# Patient Record
Sex: FEMALE | Race: WHITE | NOT HISPANIC OR LATINO | Employment: UNEMPLOYED | URBAN - METROPOLITAN AREA
[De-identification: names, ages, dates, MRNs, and addresses within clinical notes are randomized per-mention and may not be internally consistent; named-entity substitution may affect disease eponyms.]

---

## 2017-01-26 RX ORDER — VALSARTAN 160 MG/1
160 TABLET ORAL DAILY
Qty: 30 TABLET | Refills: 2 | Status: SHIPPED | OUTPATIENT
Start: 2017-01-26 | End: 2017-03-30 | Stop reason: SDUPTHER

## 2017-01-26 RX ORDER — VALSARTAN AND HYDROCHLOROTHIAZIDE 160; 12.5 MG/1; MG/1
TABLET, FILM COATED ORAL
Qty: 30 TABLET | Refills: 5 | Status: SHIPPED | OUTPATIENT
Start: 2017-01-26 | End: 2017-01-26

## 2017-01-26 NOTE — TELEPHONE ENCOUNTER
PER VO FROM DR NGUYEN PT CHANGED TO VALSARTAN 160 MG QD. PT HAS BEEN TRANSFERRED TO THE FRONT TO SCHEDULE AN APPT TO FOLLOW UP ON HTN.

## 2017-03-30 ENCOUNTER — OFFICE VISIT (OUTPATIENT)
Dept: INTERNAL MEDICINE | Facility: CLINIC | Age: 52
End: 2017-03-30

## 2017-03-30 VITALS
OXYGEN SATURATION: 98 % | DIASTOLIC BLOOD PRESSURE: 100 MMHG | TEMPERATURE: 98.2 F | SYSTOLIC BLOOD PRESSURE: 156 MMHG | HEIGHT: 67 IN | BODY MASS INDEX: 25.77 KG/M2 | WEIGHT: 164.2 LBS | HEART RATE: 78 BPM

## 2017-03-30 DIAGNOSIS — I10 ESSENTIAL HYPERTENSION: Primary | ICD-10-CM

## 2017-03-30 DIAGNOSIS — M54.50 INTERMITTENT LOW BACK PAIN: ICD-10-CM

## 2017-03-30 DIAGNOSIS — G47.00 INSOMNIA, UNSPECIFIED TYPE: ICD-10-CM

## 2017-03-30 DIAGNOSIS — J30.2 SEASONAL ALLERGIC RHINITIS, UNSPECIFIED ALLERGIC RHINITIS TRIGGER: ICD-10-CM

## 2017-03-30 PROBLEM — L30.9 ECZEMA: Status: ACTIVE | Noted: 2017-03-30

## 2017-03-30 PROBLEM — J30.9 ALLERGIC RHINITIS: Status: ACTIVE | Noted: 2017-03-30

## 2017-03-30 PROBLEM — Z00.00 ROUTINE HEALTH MAINTENANCE: Status: ACTIVE | Noted: 2017-03-30

## 2017-03-30 PROCEDURE — 99214 OFFICE O/P EST MOD 30 MIN: CPT | Performed by: FAMILY MEDICINE

## 2017-03-30 RX ORDER — CARISOPRODOL 350 MG/1
350 TABLET ORAL 3 TIMES DAILY PRN
COMMUNITY
End: 2017-10-04

## 2017-03-30 RX ORDER — VALSARTAN 160 MG/1
160 TABLET ORAL DAILY
Qty: 30 TABLET | Refills: 6 | Status: SHIPPED | OUTPATIENT
Start: 2017-03-30 | End: 2017-10-04 | Stop reason: SDUPTHER

## 2017-03-30 RX ORDER — FLUTICASONE PROPIONATE 0.05 %
CREAM (GRAM) TOPICAL 2 TIMES DAILY
COMMUNITY

## 2017-03-30 NOTE — PROGRESS NOTES
Subjective     Vanessa Morales is a 51 y.o. female, who presents with a chief complaint of   Chief Complaint   Patient presents with   • Hypertension     new patient       HPI     1. HTN.  Pt has been treated for HTN for several years.  She took hctz for years but had muscle cramps and chest pain and hypokalemia with this and these symptoms all resolved after she stopped this.  She tolerates valsartan well.  She takes this at night.  Home blood pressures 120s-140s systolic but she has spells in which her blood pressure drops low and she feels light-headed.    2. Insomnia.  She takes diphenhydramine 12.5 mg nightly.    3. Intermittent low back pain.  She does home physical therapy and 1/2 carisoprodol when this flares up.  She has been through formal PT previously.    4. Allergies.  She has been sneezing for a few weeks.    The following portions of the patient's history were reviewed and updated as appropriate: allergies, current medications, past family history, past medical history, past social history, past surgical history and problem list.    Allergies: Review of patient's allergies indicates no known allergies.    Review of Systems   Constitutional: Negative.    HENT: Positive for sneezing.    Eyes: Negative.    Respiratory: Negative.    Cardiovascular: Negative.    Gastrointestinal: Negative.    Endocrine: Negative.    Genitourinary: Negative.    Musculoskeletal: Negative.    Skin: Negative.    Allergic/Immunologic: Positive for environmental allergies.   Neurological: Negative.    Hematological: Negative.    Psychiatric/Behavioral: Negative.        Objective     Wt Readings from Last 3 Encounters:   03/30/17 164 lb 3.2 oz (74.5 kg)   09/15/16 165 lb (74.8 kg)   09/06/16 164 lb 1.6 oz (74.4 kg)     Temp Readings from Last 3 Encounters:   03/30/17 98.2 °F (36.8 °C)   06/26/15 98.4 °F (36.9 °C)     BP Readings from Last 3 Encounters:   03/30/17 156/100   09/15/16 160/90   09/06/16 (!) 146/102     Pulse Readings  from Last 3 Encounters:   03/30/17 78   09/15/16 63   09/06/16 73     Body mass index is 25.72 kg/(m^2).  SpO2 Readings from Last 3 Encounters:   03/30/17 98%   09/06/16 99%   05/31/16 97%       Physical Exam   Constitutional: She is oriented to person, place, and time. She appears well-developed and well-nourished.   HENT:   Head: Normocephalic.   Right Ear: A middle ear effusion is present.   Left Ear: A middle ear effusion is present.   Nose: Mucosal edema present.   Mouth/Throat: Oropharynx is clear and moist.   Eyes: Conjunctivae and EOM are normal. Pupils are equal, round, and reactive to light.   Neck: Normal range of motion. Neck supple. No thyromegaly present.   Cardiovascular: Normal rate, regular rhythm and normal heart sounds.    Pulmonary/Chest: Effort normal and breath sounds normal.   Abdominal: Soft. Bowel sounds are normal. There is no hepatosplenomegaly.   Musculoskeletal: Normal range of motion. She exhibits no edema.   Lymphadenopathy:     She has no cervical adenopathy.   Neurological: She is alert and oriented to person, place, and time.   Skin: Skin is warm and dry. No rash noted.   Psychiatric: She has a normal mood and affect. Her behavior is normal.   Vitals reviewed.      Assessment/Plan   Vanessa was seen today for hypertension.    Diagnoses and all orders for this visit:    Essential hypertension  -     Comprehensive Metabolic Panel; Future  -     Lipid Panel With / Chol / HDL Ratio; Future  -     TSH; Future  -     Hemoglobin A1c; Future    Intermittent low back pain    Insomnia, unspecified type  -     CBC & Differential; Future  -     Vitamin D 25 Hydroxy; Future  -     Vitamin B12; Future    Seasonal allergic rhinitis, unspecified allergic rhinitis trigger    1. HTN.  BP elevated today.  Continue same medication and monitor home blood pressures.  She does have an element of white coat hypertension.    2. Intermittent low back pain.  She knows how to handle these flares.  Feeling well  today.    3. Insomnia.  Continue OTC medications as needed.  May consider trazodone.    4. A.R. Start OTC nasal steroid.      Outpatient Medications Prior to Visit   Medication Sig Dispense Refill   • valsartan (DIOVAN) 160 MG tablet Take 1 tablet by mouth Daily. 30 tablet 2   • carisoprodol (SOMA) 350 MG tablet Take 1 tablet by mouth 3 (three) times a day as needed for muscle spasms. 30 tablet 2   • cholecalciferol (VITAMIN D3) 1000 UNITS tablet Take 1,000 Units by mouth daily.     • KLOR-CON 10 MEQ CR tablet Take 1 tablet by mouth daily. 30 tablet 5   • meloxicam (MOBIC) 15 MG tablet Take 1 tablet (15 mg total) by mouth daily. 30 tablet 0   • omeprazole (PRILOSEC) 40 MG capsule Take 1 capsule by mouth daily. 30 capsule 1   • oxyCODONE-acetaminophen (PERCOCET) 7.5-325 MG per tablet Take 1 tablet by mouth every 6 (six) hours as needed for moderate pain (4-6). Max Daily Amount: 4 tablets 24 tablet 0   • vitamin B-12 (CYANOCOBALAMIN) 1000 MCG tablet Take 1,000 mcg by mouth daily.       No facility-administered medications prior to visit.      No orders of the defined types were placed in this encounter.    [unfilled]  Medications Discontinued During This Encounter   Medication Reason   • vitamin B-12 (CYANOCOBALAMIN) 1000 MCG tablet Therapy completed   • oxyCODONE-acetaminophen (PERCOCET) 7.5-325 MG per tablet Therapy completed   • meloxicam (MOBIC) 15 MG tablet Therapy completed   • carisoprodol (SOMA) 350 MG tablet Therapy completed   • cholecalciferol (VITAMIN D3) 1000 UNITS tablet Therapy completed   • KLOR-CON 10 MEQ CR tablet Therapy completed   • omeprazole (PRILOSEC) 40 MG capsule Therapy completed         Return in about 6 months (around 9/30/2017).

## 2017-04-04 ENCOUNTER — RESULTS ENCOUNTER (OUTPATIENT)
Dept: INTERNAL MEDICINE | Facility: CLINIC | Age: 52
End: 2017-04-04

## 2017-04-04 DIAGNOSIS — I10 ESSENTIAL HYPERTENSION: ICD-10-CM

## 2017-04-04 DIAGNOSIS — G47.00 INSOMNIA, UNSPECIFIED TYPE: ICD-10-CM

## 2017-07-10 ENCOUNTER — APPOINTMENT (OUTPATIENT)
Dept: WOMENS IMAGING | Facility: HOSPITAL | Age: 52
End: 2017-07-10

## 2017-07-10 PROCEDURE — 77067 SCR MAMMO BI INCL CAD: CPT | Performed by: RADIOLOGY

## 2017-10-04 ENCOUNTER — OFFICE VISIT (OUTPATIENT)
Dept: INTERNAL MEDICINE | Facility: CLINIC | Age: 52
End: 2017-10-04

## 2017-10-04 VITALS
HEIGHT: 67 IN | TEMPERATURE: 98.3 F | DIASTOLIC BLOOD PRESSURE: 102 MMHG | SYSTOLIC BLOOD PRESSURE: 170 MMHG | BODY MASS INDEX: 25.62 KG/M2 | WEIGHT: 163.2 LBS | OXYGEN SATURATION: 98 % | HEART RATE: 70 BPM

## 2017-10-04 DIAGNOSIS — Z00.00 ANNUAL PHYSICAL EXAM: Primary | ICD-10-CM

## 2017-10-04 DIAGNOSIS — I10 ESSENTIAL HYPERTENSION: ICD-10-CM

## 2017-10-04 LAB
25(OH)D3+25(OH)D2 SERPL-MCNC: 32.9 NG/ML
ALBUMIN SERPL-MCNC: 4.5 G/DL (ref 3.5–5.2)
ALBUMIN/GLOB SERPL: 1.7 G/DL
ALP SERPL-CCNC: 66 U/L (ref 40–129)
ALT SERPL-CCNC: 15 U/L (ref 5–33)
AST SERPL-CCNC: 20 U/L (ref 5–32)
BASOPHILS # BLD AUTO: 0.06 10*3/MM3 (ref 0–0.2)
BASOPHILS NFR BLD AUTO: 1.2 % (ref 0–2)
BILIRUB SERPL-MCNC: 1 MG/DL (ref 0.2–1.2)
BUN SERPL-MCNC: 15 MG/DL (ref 6–20)
BUN/CREAT SERPL: 19.5 (ref 7–25)
CALCIUM SERPL-MCNC: 9.5 MG/DL (ref 8.6–10.5)
CHLORIDE SERPL-SCNC: 101 MMOL/L (ref 98–107)
CHOLEST SERPL-MCNC: 183 MG/DL (ref 0–200)
CHOLEST/HDLC SERPL: 2.41 {RATIO}
CO2 SERPL-SCNC: 29.1 MMOL/L (ref 22–29)
CREAT SERPL-MCNC: 0.77 MG/DL (ref 0.57–1)
EOSINOPHIL # BLD AUTO: 0.27 10*3/MM3 (ref 0.1–0.3)
EOSINOPHIL NFR BLD AUTO: 5.2 % (ref 0–4)
ERYTHROCYTE [DISTWIDTH] IN BLOOD BY AUTOMATED COUNT: 12.8 % (ref 11.5–14.5)
GLOBULIN SER CALC-MCNC: 2.6 GM/DL
GLUCOSE SERPL-MCNC: 94 MG/DL (ref 65–99)
HBA1C MFR BLD: 5.3 % (ref 4.8–5.6)
HCT VFR BLD AUTO: 39.6 % (ref 37–47)
HDLC SERPL-MCNC: 76 MG/DL (ref 40–60)
HGB BLD-MCNC: 13.7 G/DL (ref 12–16)
IMM GRANULOCYTES # BLD: 0.01 10*3/MM3 (ref 0–0.03)
IMM GRANULOCYTES NFR BLD: 0.2 % (ref 0–0.5)
LDLC SERPL CALC-MCNC: 95 MG/DL (ref 0–100)
LYMPHOCYTES # BLD AUTO: 1.19 10*3/MM3 (ref 0.6–4.8)
LYMPHOCYTES NFR BLD AUTO: 23 % (ref 20–45)
MCH RBC QN AUTO: 29.7 PG (ref 27–31)
MCHC RBC AUTO-ENTMCNC: 34.6 G/DL (ref 31–37)
MCV RBC AUTO: 85.7 FL (ref 81–99)
MONOCYTES # BLD AUTO: 0.39 10*3/MM3 (ref 0–1)
MONOCYTES NFR BLD AUTO: 7.5 % (ref 3–8)
NEUTROPHILS # BLD AUTO: 3.26 10*3/MM3 (ref 1.5–8.3)
NEUTROPHILS NFR BLD AUTO: 62.9 % (ref 45–70)
NRBC BLD AUTO-RTO: 0 /100 WBC (ref 0–0)
PLATELET # BLD AUTO: 279 10*3/MM3 (ref 140–500)
POTASSIUM SERPL-SCNC: 4 MMOL/L (ref 3.5–5.2)
PROT SERPL-MCNC: 7.1 G/DL (ref 6–8.5)
RBC # BLD AUTO: 4.62 10*6/MM3 (ref 4.2–5.4)
SODIUM SERPL-SCNC: 141 MMOL/L (ref 136–145)
TRIGL SERPL-MCNC: 61 MG/DL (ref 0–150)
TSH SERPL DL<=0.005 MIU/L-ACNC: 1.24 MIU/ML (ref 0.27–4.2)
VIT B12 SERPL-MCNC: 359 PG/ML (ref 211–946)
VLDLC SERPL CALC-MCNC: 12.2 MG/DL (ref 7–27)
WBC # BLD AUTO: 5.18 10*3/MM3 (ref 4.8–10.8)

## 2017-10-04 PROCEDURE — 99396 PREV VISIT EST AGE 40-64: CPT | Performed by: FAMILY MEDICINE

## 2017-10-04 RX ORDER — VALSARTAN 160 MG/1
240 TABLET ORAL DAILY
Qty: 135 TABLET | Refills: 1 | Status: SHIPPED | OUTPATIENT
Start: 2017-10-04 | End: 2017-11-01 | Stop reason: SDUPTHER

## 2017-10-04 RX ORDER — SODIUM PHOSPHATE,MONO-DIBASIC 19G-7G/118
ENEMA (ML) RECTAL
COMMUNITY
End: 2018-04-23

## 2017-10-04 NOTE — PROGRESS NOTES
Patient Name: Vanessa Mendez is a 52 y.o. female presenting for Annual Exam      Well Adult Physical   Patient here for a comprehensive physical exam.The patient reports no problems    Do you take any herbs or supplements that were not prescribed by a doctor? yes   Are you taking calcium supplements? no   Are you taking aspirin daily? no     History:  Any STD's in the past? none    Vanessa Morales 52 y.o. female who presents for an Annual Wellness Visit.  she has a history of   Patient Active Problem List   Diagnosis   • Hypertension   • Intermittent low back pain   • Sacroiliac joint dysfunction of left side   • Insomnia   • Eczema   • Routine health maintenance   • Allergic rhinitis   .  she has been doing well with new interval problems.      Health Habits:  Dental Exam. up to date  Eye Exam. up to date  Exercise: 7 times/week.  Current exercise activities include: walking and yoga    Menstrual history:  Last pap date:   Abnormal pap?   Mammogram:  Dexa:  Colonoscopy:  Tob use:  Qualifies for lung Ca screening?      The following portions of the patient's history were reviewed and updated as appropriate: allergies, current medications, past family history, past medical history, past social history, past surgical history and problem list.    Review of Systems   Constitutional: Negative.    HENT: Negative.    Eyes: Negative.    Respiratory: Negative.    Cardiovascular: Negative.    Gastrointestinal: Negative.    Endocrine: Negative.    Genitourinary: Negative.    Musculoskeletal: Positive for back pain.   Skin: Positive for rash.   Allergic/Immunologic: Negative.    Neurological: Negative.    Hematological: Negative.        Review of patient's allergies indicates no known allergies.      Current Outpatient Prescriptions:   •  fluticasone (CUTIVATE) 0.05 % cream, Apply  topically 2 (Two) Times a Day., Disp: , Rfl:   •  glucosamine sulfate 500 MG capsule capsule, Take  by mouth 3 (Three) Times a Day  "With Meals., Disp: , Rfl:   •  valsartan (DIOVAN) 160 MG tablet, Take 1.5 tablets by mouth Daily. Take 1/2 tablet in the morning and 1 tablet in the evening., Disp: 135 tablet, Rfl: 1    OBJECTIVE    BP (!) 170/102  Pulse 70  Temp 98.3 °F (36.8 °C)  Ht 67\" (170.2 cm)  Wt 163 lb 3.2 oz (74 kg)  SpO2 98%  BMI 25.56 kg/m2    Physical Exam   Constitutional: She is oriented to person, place, and time. She appears well-developed and well-nourished.   HENT:   Head: Normocephalic and atraumatic.   Right Ear: External ear normal.   Left Ear: External ear normal.   Nose: Nose normal.   Mouth/Throat: Oropharynx is clear and moist.   Eyes: Conjunctivae and EOM are normal. Pupils are equal, round, and reactive to light. No scleral icterus.   Neck: Normal range of motion. Neck supple. No thyromegaly present.   Cardiovascular: Normal rate, regular rhythm, normal heart sounds and intact distal pulses.  Exam reveals no gallop and no friction rub.    No murmur heard.  Pulmonary/Chest: Effort normal and breath sounds normal. No respiratory distress. She has no wheezes. She has no rales.   Abdominal: Soft. Bowel sounds are normal. There is no hepatosplenomegaly.   Musculoskeletal: She exhibits no edema or deformity.   Lymphadenopathy:     She has no cervical adenopathy.   Neurological: She is alert and oriented to person, place, and time. She has normal reflexes. She displays normal reflexes. No cranial nerve deficit. She exhibits normal muscle tone. Coordination normal.   Skin: Skin is warm and dry. No rash noted.   Psychiatric: She has a normal mood and affect. Her behavior is normal. Judgment and thought content normal.       Vanessa was seen today for annual exam.    Diagnoses and all orders for this visit:    Annual physical exam  -     CBC & Differential  -     Comprehensive Metabolic Panel  -     Hemoglobin A1c  -     Lipid Panel With / Chol / HDL Ratio  -     TSH  -     Vitamin B12  -     Vitamin D 25 Hydroxy  -     " Hepatitis C antibody; Future    Essential hypertension  -     valsartan (DIOVAN) 160 MG tablet; Take 1.5 tablets by mouth Daily. Take 1/2 tablet in the morning and 1 tablet in the evening.    UTD on Tdap (July 2017), declines flu vaccine; she will look into Zoster vaccine at pharmacy.  UTD on pap smear and mammogram through Dr. Pearson.  UTD on colonoscopy.  Labs today.    For HTN, will increase valsartan to 1.5 tablets daily.  Monitor home blood pressures.  F/U 1 month.    [unfilled]    Return in about 4 weeks (around 11/1/2017).

## 2017-10-05 ENCOUNTER — TELEPHONE (OUTPATIENT)
Dept: INTERNAL MEDICINE | Facility: CLINIC | Age: 52
End: 2017-10-05

## 2017-10-05 NOTE — TELEPHONE ENCOUNTER
Patient advised of results.     ----- Message from Josh Cohen MD sent at 10/4/2017 10:34 PM EDT -----  Please call the patient regarding her result.  Labs are normal.

## 2017-10-09 ENCOUNTER — RESULTS ENCOUNTER (OUTPATIENT)
Dept: INTERNAL MEDICINE | Facility: CLINIC | Age: 52
End: 2017-10-09

## 2017-10-09 DIAGNOSIS — Z00.00 ANNUAL PHYSICAL EXAM: ICD-10-CM

## 2017-11-01 ENCOUNTER — OFFICE VISIT (OUTPATIENT)
Dept: INTERNAL MEDICINE | Facility: CLINIC | Age: 52
End: 2017-11-01

## 2017-11-01 VITALS
HEIGHT: 67 IN | HEART RATE: 71 BPM | TEMPERATURE: 98.6 F | OXYGEN SATURATION: 99 % | SYSTOLIC BLOOD PRESSURE: 140 MMHG | WEIGHT: 165.6 LBS | DIASTOLIC BLOOD PRESSURE: 86 MMHG | BODY MASS INDEX: 25.99 KG/M2

## 2017-11-01 DIAGNOSIS — R00.2 PALPITATIONS: ICD-10-CM

## 2017-11-01 DIAGNOSIS — I10 ESSENTIAL HYPERTENSION: Primary | ICD-10-CM

## 2017-11-01 PROCEDURE — 99213 OFFICE O/P EST LOW 20 MIN: CPT | Performed by: FAMILY MEDICINE

## 2017-11-01 RX ORDER — VALSARTAN 160 MG/1
160 TABLET ORAL 2 TIMES DAILY
Qty: 180 TABLET | Refills: 1 | Status: SHIPPED | OUTPATIENT
Start: 2017-11-01 | End: 2018-04-23 | Stop reason: SDUPTHER

## 2017-11-01 NOTE — PROGRESS NOTES
Subjective     Vanessa Morales is a 52 y.o. female, who presents with a chief complaint of   Chief Complaint   Patient presents with   • Hypertension       HPI     Pt here to f/u on hypertension.  We increased the valsartan 160 mg tabs to 1.5 tablets daily.  She denies dizziness.  Home blood pressures running 141-166/70s-90.  She has palpitations intermittently X years.  She had a normal stress test one year ago.      The following portions of the patient's history were reviewed and updated as appropriate: allergies, current medications, past family history, past medical history, past social history, past surgical history and problem list.    Allergies: Review of patient's allergies indicates no known allergies.    Review of Systems   Constitutional: Negative.    HENT: Negative.    Eyes: Negative.    Respiratory: Negative.    Cardiovascular: Positive for palpitations.   Gastrointestinal: Negative.    Endocrine: Negative.    Genitourinary: Negative.    Musculoskeletal: Positive for back pain.   Allergic/Immunologic: Negative.    Neurological: Negative.    Hematological: Negative.        Objective     Wt Readings from Last 3 Encounters:   11/01/17 165 lb 9.6 oz (75.1 kg)   10/04/17 163 lb 3.2 oz (74 kg)   03/30/17 164 lb 3.2 oz (74.5 kg)     Temp Readings from Last 3 Encounters:   11/01/17 98.6 °F (37 °C)   10/04/17 98.3 °F (36.8 °C)   03/30/17 98.2 °F (36.8 °C)     BP Readings from Last 3 Encounters:   11/01/17 140/86   10/04/17 (!) 170/102   03/30/17 156/100     Pulse Readings from Last 3 Encounters:   11/01/17 71   10/04/17 70   03/30/17 78     Body mass index is 25.94 kg/(m^2).  SpO2 Readings from Last 3 Encounters:   11/01/17 99%   10/04/17 98%   03/30/17 98%       Physical Exam   Constitutional: She is oriented to person, place, and time. She appears well-developed and well-nourished.   HENT:   Head: Normocephalic.   Mouth/Throat: Oropharynx is clear and moist.   Eyes: Conjunctivae and EOM are normal. Pupils are  equal, round, and reactive to light.   Neck: Normal range of motion. Neck supple. No thyromegaly present.   Cardiovascular: Normal rate, regular rhythm and normal heart sounds.    Pulmonary/Chest: Effort normal and breath sounds normal.   Abdominal: Soft. Bowel sounds are normal. There is no hepatosplenomegaly.   Musculoskeletal: Normal range of motion. She exhibits no edema.   Lymphadenopathy:     She has no cervical adenopathy.   Neurological: She is alert and oriented to person, place, and time.   Skin: Skin is warm and dry. No rash noted.   Psychiatric: She has a normal mood and affect. Her behavior is normal.   Vitals reviewed.      Results for orders placed or performed in visit on 10/04/17   Comprehensive Metabolic Panel   Result Value Ref Range    Glucose 94 65 - 99 mg/dL    BUN 15 6 - 20 mg/dL    Creatinine 0.77 0.57 - 1.00 mg/dL    eGFR Non African Am 78 >60 mL/min/1.73    eGFR African Am 95 >60 mL/min/1.73    BUN/Creatinine Ratio 19.5 7.0 - 25.0    Sodium 141 136 - 145 mmol/L    Potassium 4.0 3.5 - 5.2 mmol/L    Chloride 101 98 - 107 mmol/L    Total CO2 29.1 (H) 22.0 - 29.0 mmol/L    Calcium 9.5 8.6 - 10.5 mg/dL    Total Protein 7.1 6.0 - 8.5 g/dL    Albumin 4.50 3.50 - 5.20 g/dL    Globulin 2.6 gm/dL    A/G Ratio 1.7 g/dL    Total Bilirubin 1.0 0.2 - 1.2 mg/dL    Alkaline Phosphatase 66 40 - 129 U/L    AST (SGOT) 20 5 - 32 U/L    ALT (SGPT) 15 5 - 33 U/L   Hemoglobin A1c   Result Value Ref Range    Hemoglobin A1C 5.30 4.80 - 5.60 %   Lipid Panel With / Chol / HDL Ratio   Result Value Ref Range    Total Cholesterol 183 0 - 200 mg/dL    Triglycerides 61 0 - 150 mg/dL    HDL Cholesterol 76 (H) 40 - 60 mg/dL    VLDL Cholesterol 12.2 7 - 27 mg/dL    LDL Cholesterol  95 0 - 100 mg/dL    Chol/HDL Ratio 2.41    TSH   Result Value Ref Range    TSH 1.240 0.270 - 4.200 mIU/mL   Vitamin B12   Result Value Ref Range    Vitamin B-12 359 211 - 946 pg/mL   Vitamin D 25 Hydroxy   Result Value Ref Range    25 Hydroxy,  Vitamin D 32.9 ng/mL   CBC & Differential   Result Value Ref Range    WBC 5.18 4.80 - 10.80 10*3/mm3    RBC 4.62 4.20 - 5.40 10*6/mm3    Hemoglobin 13.7 12.0 - 16.0 g/dL    Hematocrit 39.6 37.0 - 47.0 %    MCV 85.7 81.0 - 99.0 fL    MCH 29.7 27.0 - 31.0 pg    MCHC 34.6 31.0 - 37.0 g/dL    RDW 12.8 11.5 - 14.5 %    Platelets 279 140 - 500 10*3/mm3    Neutrophil Rel % 62.9 45.0 - 70.0 %    Lymphocyte Rel % 23.0 20.0 - 45.0 %    Monocyte Rel % 7.5 3.0 - 8.0 %    Eosinophil Rel % 5.2 (H) 0.0 - 4.0 %    Basophil Rel % 1.2 0.0 - 2.0 %    Neutrophils Absolute 3.26 1.50 - 8.30 10*3/mm3    Lymphocytes Absolute 1.19 0.60 - 4.80 10*3/mm3    Monocytes Absolute 0.39 0.00 - 1.00 10*3/mm3    Eosinophils Absolute 0.27 0.10 - 0.30 10*3/mm3    Basophils Absolute 0.06 0.00 - 0.20 10*3/mm3    Immature Granulocyte Rel % 0.2 0.0 - 0.5 %    Immature Grans Absolute 0.01 0.00 - 0.03 10*3/mm3    nRBC 0.0 0.0 - 0.0 /100 WBC       Assessment/Plan   Vanessa was seen today for hypertension.    Diagnoses and all orders for this visit:    Essential hypertension  -     valsartan (DIOVAN) 160 MG tablet; Take 1 tablet by mouth 2 (Two) Times a Day. Take 1/2 tablet in the morning and 1 tablet in the evening.    Palpitations  -     Holter Monitor - 24 Hour; Future    1. HTN.  Improved but still not at goal.  Increase valsartan to 160 mg BID (doesn't seem to last 24 hours in her so we're splitting into two doses).    2. Palpitations.  Chronic.  Stress test result reviewed.  Holter monitor ordered.      Outpatient Medications Prior to Visit   Medication Sig Dispense Refill   • fluticasone (CUTIVATE) 0.05 % cream Apply  topically 2 (Two) Times a Day.     • glucosamine sulfate 500 MG capsule capsule Take  by mouth 3 (Three) Times a Day With Meals.     • valsartan (DIOVAN) 160 MG tablet Take 1.5 tablets by mouth Daily. Take 1/2 tablet in the morning and 1 tablet in the evening. 135 tablet 1     No facility-administered medications prior to visit.      New  Medications Ordered This Visit   Medications   • valsartan (DIOVAN) 160 MG tablet     Sig: Take 1 tablet by mouth 2 (Two) Times a Day. Take 1/2 tablet in the morning and 1 tablet in the evening.     Dispense:  180 tablet     Refill:  1     [unfilled]  Medications Discontinued During This Encounter   Medication Reason   • valsartan (DIOVAN) 160 MG tablet Reorder         Return in about 4 weeks (around 11/29/2017).

## 2017-11-06 ENCOUNTER — HOSPITAL ENCOUNTER (OUTPATIENT)
Dept: CARDIOLOGY | Facility: HOSPITAL | Age: 52
Discharge: HOME | End: 2017-11-06
Attending: FAMILY MEDICINE | Admitting: FAMILY MEDICINE

## 2017-11-06 DIAGNOSIS — R00.2 PALPITATIONS: ICD-10-CM

## 2017-11-06 PROCEDURE — 93226 XTRNL ECG REC<48 HR SCAN A/R: CPT

## 2017-11-06 PROCEDURE — 93225 XTRNL ECG REC<48 HRS REC: CPT

## 2017-11-07 PROCEDURE — 93227 XTRNL ECG REC<48 HR R&I: CPT | Performed by: INTERNAL MEDICINE

## 2017-12-14 ENCOUNTER — TELEPHONE (OUTPATIENT)
Dept: INTERNAL MEDICINE | Facility: CLINIC | Age: 52
End: 2017-12-14

## 2017-12-14 DIAGNOSIS — I10 ESSENTIAL HYPERTENSION: Primary | ICD-10-CM

## 2017-12-14 RX ORDER — AMLODIPINE BESYLATE 5 MG/1
5 TABLET ORAL DAILY
Qty: 30 TABLET | Refills: 2 | Status: SHIPPED | OUTPATIENT
Start: 2017-12-14 | End: 2018-01-23 | Stop reason: SDUPTHER

## 2017-12-14 NOTE — TELEPHONE ENCOUNTER
Patient advised as per below and scheduled f/u 1/22/2018.    ----- Message from Josh Brink MD sent at 12/14/2017  5:08 PM EST -----  I escribed amlodipine 5 mg.  She should take this in addition to the valsartan.  Increase to two tablets daily in 2 weeks if still not at goal.  Follow up with me in 4 weeks.  ----- Message -----     From: Hermila Pollard MA     Sent: 12/14/2017   8:17 AM       To: Josh Brink MD     PT  HAS ALREADY  BEEN HERE SEVERAL TIMES, COST  100 EVERY TIME SHE COMES IN  AND SHE CAN AFFORT TO COME IN    ----- Message -----     From: Josh Brink MD     Sent: 12/13/2017   4:58 PM       To: Hermila Pollard MA    She needs an appointment.  She was supposed to f/u in 4 weeks to see if she this med would get her controlled or if we would need to add a second medication.  ----- Message -----     From: Hermila Pollard MA     Sent: 12/12/2017   3:58 PM       To: Josh Brink MD, Aiyana Fitzgerald MA        ----- Message -----     From: Mariaelena Cook     Sent: 12/12/2017   3:48 PM       To: Cristi Stephenson [facility]    DR BRINK HAS PUT HER ON MAX DOSE OF BP MEDS AND IT'S NOT REALLY WORKING.  SHE DIDN'T KNOW IF SHE WANTED TO CHANGE THE MEDICINE TO SOMETHING ELSE OR WHAT.  SHE DOES NEED A 90 DAY SUPPLY IN ORDER FOR HER INSURANCE TO COVER IT.    [facility] [place]    [phone]

## 2017-12-18 ENCOUNTER — TELEPHONE (OUTPATIENT)
Dept: INTERNAL MEDICINE | Facility: CLINIC | Age: 52
End: 2017-12-18

## 2017-12-18 NOTE — TELEPHONE ENCOUNTER
----- Message from Josh Brink MD sent at 12/18/2017  9:55 AM EST -----  Yes, I already addressed it.  I added amlodipine and said she needs f/u in 1 month.  ----- Message -----     From: Aiyana Fitzgerald MA     Sent: 12/15/2017  12:02 PM       To: Josh Brink MD    You probably have already seen this but I am always afraid something will fall through the cracks.dg;)  ----- Message -----     From: Hermila Pollard MA     Sent: 12/12/2017   3:58 PM       To: Josh Brink MD, Aiyana Fitzgerald MA        ----- Message -----     From: Mariaelena Cook     Sent: 12/12/2017   3:48 PM       To: Cristi Stephenson [facility]    DR BRINK HAS PUT HER ON MAX DOSE OF BP MEDS AND IT'S NOT REALLY WORKING.  SHE DIDN'T KNOW IF SHE WANTED TO CHANGE THE MEDICINE TO SOMETHING ELSE OR WHAT.  SHE DOES NEED A 90 DAY SUPPLY IN ORDER FOR HER INSURANCE TO COVER IT.    [facility] IN [place]    [phone]

## 2017-12-19 ENCOUNTER — TELEPHONE (OUTPATIENT)
Dept: INTERNAL MEDICINE | Facility: CLINIC | Age: 52
End: 2017-12-19

## 2017-12-19 NOTE — TELEPHONE ENCOUNTER
----- Message from Josh Brink MD sent at 12/18/2017  9:55 AM EST -----  Yes, I already addressed it.  I added amlodipine and said she needs f/u in 1 month.  ----- Message -----     From: Aiyana Fitzgerald MA     Sent: 12/15/2017  12:02 PM       To: Johs Brink MD    You probably have already seen this but I am always afraid something will fall through the cracks.dg;)  ----- Message -----     From: Hermila Pollard MA     Sent: 12/12/2017   3:58 PM       To: Josh Brink MD, Aiyana Fitzgerald MA        ----- Message -----     From: Mariaelena Cook     Sent: 12/12/2017   3:48 PM       To: Cristi Valencia[address] [place] [address]    DR BRINK HAS PUT HER ON MAX DOSE OF BP MEDS AND IT'S NOT REALLY WORKING.  SHE DIDN'T KNOW IF SHE WANTED TO CHANGE THE MEDICINE TO SOMETHING ELSE OR WHAT.  SHE DOES NEED A 90 DAY SUPPLY IN ORDER FOR HER INSURANCE TO COVER IT.    [facility] [place]    [phone]      Pt was taken care of.dg

## 2018-01-18 ENCOUNTER — TELEPHONE (OUTPATIENT)
Dept: INTERNAL MEDICINE | Facility: CLINIC | Age: 53
End: 2018-01-18

## 2018-01-18 NOTE — TELEPHONE ENCOUNTER
Pt called and said that she is doing so well with the BP meds that you gave her that she would prefer not to come in for a recheck. She is afraid of the flu. She wants to come in about 3 months for her physical and stay on the pills she is taking since it is working so well. She is taking Norvasc 5 mg daily and Valsartan l60 2 tabs daily. I will call her if you want her to come in.dg

## 2018-01-23 DIAGNOSIS — I10 ESSENTIAL HYPERTENSION: ICD-10-CM

## 2018-01-23 RX ORDER — AMLODIPINE BESYLATE 5 MG/1
TABLET ORAL
Qty: 30 TABLET | Refills: 2 | Status: SHIPPED | OUTPATIENT
Start: 2018-01-23 | End: 2018-01-25 | Stop reason: SDUPTHER

## 2018-01-25 DIAGNOSIS — I10 ESSENTIAL HYPERTENSION: ICD-10-CM

## 2018-01-25 RX ORDER — AMLODIPINE BESYLATE 5 MG/1
TABLET ORAL
Qty: 30 TABLET | Refills: 2 | Status: CANCELLED | OUTPATIENT
Start: 2018-01-25

## 2018-01-25 RX ORDER — AMLODIPINE BESYLATE 5 MG/1
TABLET ORAL
Qty: 180 TABLET | Refills: 2 | Status: SHIPPED | OUTPATIENT
Start: 2018-01-25 | End: 2018-04-23

## 2018-04-23 ENCOUNTER — OFFICE VISIT (OUTPATIENT)
Dept: INTERNAL MEDICINE | Facility: CLINIC | Age: 53
End: 2018-04-23

## 2018-04-23 VITALS
RESPIRATION RATE: 18 BRPM | SYSTOLIC BLOOD PRESSURE: 150 MMHG | TEMPERATURE: 98.3 F | WEIGHT: 168.4 LBS | BODY MASS INDEX: 26.43 KG/M2 | HEART RATE: 73 BPM | DIASTOLIC BLOOD PRESSURE: 90 MMHG | OXYGEN SATURATION: 98 % | HEIGHT: 67 IN

## 2018-04-23 DIAGNOSIS — I10 ESSENTIAL HYPERTENSION: ICD-10-CM

## 2018-04-23 DIAGNOSIS — Z00.00 ANNUAL PHYSICAL EXAM: Primary | ICD-10-CM

## 2018-04-23 PROCEDURE — 99396 PREV VISIT EST AGE 40-64: CPT | Performed by: FAMILY MEDICINE

## 2018-04-23 RX ORDER — FLUTICASONE PROPIONATE 50 MCG
2 SPRAY, SUSPENSION (ML) NASAL DAILY
Qty: 1 BOTTLE | Refills: 2 | Status: SHIPPED | OUTPATIENT
Start: 2018-04-23

## 2018-04-23 RX ORDER — VALSARTAN 160 MG/1
TABLET ORAL
Qty: 180 TABLET | Refills: 1 | Status: SHIPPED | OUTPATIENT
Start: 2018-04-23 | End: 2018-08-11 | Stop reason: SDUPTHER

## 2018-04-23 RX ORDER — VALSARTAN 160 MG/1
160 TABLET ORAL 2 TIMES DAILY
Qty: 180 TABLET | Refills: 1 | Status: SHIPPED | OUTPATIENT
Start: 2018-04-23 | End: 2018-04-23 | Stop reason: SDUPTHER

## 2018-04-23 NOTE — PROGRESS NOTES
Patient Name: Vanessa Mendez is a 52 y.o. female presenting for Annual Exam      Well Adult Physical   Patient here for a comprehensive physical exam.The patient reports no problems    Do you take any herbs or supplements that were not prescribed by a doctor? no   Are you taking calcium supplements? no   Are you taking aspirin daily? no     History:  Any STD's in the past? none    Vanessa Morales 52 y.o. female who presents for an Annual Wellness Visit.  she has a history of   Patient Active Problem List   Diagnosis   • Hypertension   • Intermittent low back pain   • Sacroiliac joint dysfunction of left side   • Insomnia   • Eczema   • Routine health maintenance   • Allergic rhinitis   • Palpitations   .  she has been doing well with new interval problems.      Health Habits:  Dental Exam. up to date  Eye Exam. up to date  Exercise: 4 times/week.  Current exercise activities include: walking      The following portions of the patient's history were reviewed and updated as appropriate: allergies, current medications, past family history, past medical history, past social history, past surgical history and problem list.    Review of Systems   Constitutional: Negative.    HENT: Negative.    Eyes: Negative.    Respiratory: Negative.    Cardiovascular: Negative.    Gastrointestinal: Negative.    Endocrine: Negative.    Genitourinary: Negative.    Musculoskeletal: Negative.    Skin: Negative.    Allergic/Immunologic: Positive for environmental allergies.   Neurological: Negative.    Hematological: Negative.    Psychiatric/Behavioral: Negative.        Review of patient's allergies indicates no known allergies.      Current Outpatient Prescriptions:   •  fluticasone (CUTIVATE) 0.05 % cream, Apply  topically 2 (Two) Times a Day., Disp: , Rfl:   •  valsartan (DIOVAN) 160 MG tablet, Take 1 tablet by mouth 2 (Two) Times a Day. Take 1/2 tablet in the morning and 1 tablet in the evening., Disp: 180 tablet, Rfl:  "1  •  fluticasone (FLONASE) 50 MCG/ACT nasal spray, 2 sprays into each nostril Daily., Disp: 1 bottle, Rfl: 2    OBJECTIVE    /90   Pulse 73   Temp 98.3 °F (36.8 °C)   Resp 18   Ht 170.2 cm (67.01\")   Wt 76.4 kg (168 lb 6.4 oz)   SpO2 98%   BMI 26.37 kg/m²     Physical Exam   Constitutional: She is oriented to person, place, and time. She appears well-developed and well-nourished.   HENT:   Head: Normocephalic and atraumatic.   Right Ear: External ear normal.   Left Ear: External ear normal.   Nose: Nose normal.   Mouth/Throat: Oropharynx is clear and moist.   Eyes: Conjunctivae and EOM are normal. Pupils are equal, round, and reactive to light. No scleral icterus.   Neck: Normal range of motion. Neck supple. No thyromegaly present.   Cardiovascular: Normal rate, regular rhythm, normal heart sounds and intact distal pulses.  Exam reveals no gallop and no friction rub.    No murmur heard.  Pulmonary/Chest: Effort normal and breath sounds normal. No respiratory distress. She has no wheezes. She has no rales.   Abdominal: Soft. Bowel sounds are normal. She exhibits no mass. There is no hepatosplenomegaly. There is no tenderness.   Musculoskeletal: She exhibits no edema or deformity.   Lymphadenopathy:     She has no cervical adenopathy.   Neurological: She is alert and oriented to person, place, and time. She has normal reflexes. She displays normal reflexes. No cranial nerve deficit. She exhibits normal muscle tone. Coordination normal.   Skin: Skin is warm and dry. No rash noted.   Psychiatric: She has a normal mood and affect. Her behavior is normal. Judgment and thought content normal.   Nursing note reviewed.      ASSESSMENT AND PLAN  Update vaccines if indicated.    attempt to lose weight, reduce salt in diet and cooking, continue current medications and return for routine annual checkups    Vanessa was seen today for annual exam.    Diagnoses and all orders for this visit:    Annual physical exam  -   "   CBC & Differential; Future  -     Comprehensive Metabolic Panel; Future  -     Lipid Panel With / Chol / HDL Ratio; Future  -     TSH; Future  -     Vitamin B12; Future  -     Vitamin D 25 Hydroxy; Future  -     Hepatitis C Antibody; Future    Other orders  -     fluticasone (FLONASE) 50 MCG/ACT nasal spray; 2 sprays into each nostril Daily.    Colonoscopy UTD; she will see Dr. Pearson in August for gyn, mammo, pap smear.  Tdap done 7/2017.  Hepatitis A vaccine at pharmacy.  Considering zoster vaccine at pharmacy.    [unfilled]    Return in about 6 months (around 10/23/2018).

## 2018-08-11 DIAGNOSIS — I10 ESSENTIAL HYPERTENSION: ICD-10-CM

## 2018-08-13 RX ORDER — VALSARTAN 160 MG/1
TABLET ORAL
Qty: 180 TABLET | Refills: 1 | Status: SHIPPED | OUTPATIENT
Start: 2018-08-13 | End: 2018-08-23

## 2018-08-14 ENCOUNTER — APPOINTMENT (OUTPATIENT)
Dept: WOMENS IMAGING | Facility: HOSPITAL | Age: 53
End: 2018-08-14

## 2018-08-14 PROCEDURE — 77067 SCR MAMMO BI INCL CAD: CPT | Performed by: RADIOLOGY

## 2018-08-24 ENCOUNTER — TELEPHONE (OUTPATIENT)
Dept: INTERNAL MEDICINE | Facility: CLINIC | Age: 53
End: 2018-08-24

## 2018-08-24 NOTE — TELEPHONE ENCOUNTER
PT   AWARE OF CORRECTION OF  PREV.  SIG ON SCRIPT          ----- Message from Josh Cohen MD sent at 8/23/2018  5:45 PM EDT -----  Sorry--start with 50 mg daily for one week.  After a week, increase to 100 mg daily.  Need to start with lower dose with beta blockers.  ----- Message -----  From: Hermila Pollard MA  Sent: 8/23/2018   2:02 PM  To: Josh Cohen MD    Did you mean   50mg  2 daily for   100mg  Daily.  ?  ----- Message -----  From: Josh Cohen MD  Sent: 8/23/2018  12:43 PM  To: Cristi Valencia[address] [place] [address]    Stop the valsartan and start metoprolol XL (I already e-prescribed it.)  50 mg daily X 1 week, then 100 mg (two tablets) daily.  Monitor home blood pressures.  We may need to increase it more than that.    ----- Message -----  From: Hermila Pollard MA  Sent: 8/23/2018  12:11 PM  To: Josh Cohen MD    PT  CALLED SHE HAD  TRIED   DIFFERENT  BUT SHE IS GETTING RECALL ON THOSE.   SHE THINKS IT MIGHT BE A GOOD IDEA THAT YOU CHANGE HER MEDICATION.   HER SISTER TAKES  METOPROLOL AND SHE WANTS TO TRY THIS ALSO.   [phone]

## 2018-08-30 ENCOUNTER — TELEPHONE (OUTPATIENT)
Dept: INTERNAL MEDICINE | Facility: CLINIC | Age: 53
End: 2018-08-30

## 2018-08-30 RX ORDER — LOSARTAN POTASSIUM 100 MG/1
100 TABLET ORAL DAILY
Qty: 30 TABLET | Refills: 0 | Status: SHIPPED | OUTPATIENT
Start: 2018-08-30 | End: 2018-09-17 | Stop reason: SDUPTHER

## 2018-08-30 NOTE — TELEPHONE ENCOUNTER
Sent to pharmacy   Martha Aquino, already has appt w/ dr muñoz        ----- Message from MARQUISE Falcon sent at 2018 12:42 PM EDT -----  Stop Metoprolol.     Start Losartan 100 mg   Si po daily   disp #30    BP F/U appt next week with Dr. PETERS for repeat levels.     ----- Message -----  From: Dee Aiken MA  Sent: 2018   5:29 PM  To: MARQUISE Falcon, MKIKI Yi Dr. patient.  Since starting Metoprolol 5 days ago, patient has had insomnia (off Valsartan due to recall) and racing thoughts.  States that HR is better but BP running in the range of 160/90.     SEND RESPONSE TO MARTHA

## 2018-09-17 RX ORDER — LOSARTAN POTASSIUM 100 MG/1
TABLET ORAL
Qty: 90 TABLET | Refills: 1 | Status: SHIPPED | OUTPATIENT
Start: 2018-09-17 | End: 2019-03-08 | Stop reason: SDUPTHER

## 2018-10-10 ENCOUNTER — OFFICE VISIT (OUTPATIENT)
Dept: INTERNAL MEDICINE | Facility: CLINIC | Age: 53
End: 2018-10-10

## 2018-10-10 VITALS
DIASTOLIC BLOOD PRESSURE: 96 MMHG | WEIGHT: 154 LBS | HEART RATE: 69 BPM | RESPIRATION RATE: 16 BRPM | BODY MASS INDEX: 24.17 KG/M2 | HEIGHT: 67 IN | TEMPERATURE: 98.1 F | OXYGEN SATURATION: 99 % | SYSTOLIC BLOOD PRESSURE: 158 MMHG

## 2018-10-10 DIAGNOSIS — J30.2 SEASONAL ALLERGIC RHINITIS, UNSPECIFIED TRIGGER: ICD-10-CM

## 2018-10-10 DIAGNOSIS — I10 ESSENTIAL HYPERTENSION: Primary | ICD-10-CM

## 2018-10-10 DIAGNOSIS — Z00.00 ROUTINE HEALTH MAINTENANCE: ICD-10-CM

## 2018-10-10 PROCEDURE — 99214 OFFICE O/P EST MOD 30 MIN: CPT | Performed by: FAMILY MEDICINE

## 2018-10-10 NOTE — PROGRESS NOTES
Vanessa Morales is a 53 y.o. female, who presents with a chief complaint of   Chief Complaint   Patient presents with   • Hypertension       HPI     1. Pt here to f/u on hypertension.  Home blood pressures somewhat labile.  Ranging 115-158/60s-90s.  Denies chest pain, dizziness, headache.  She has been walking and losing weight.    2. Allergic rhinitis.  She says she's doing pretty well. Uses Flonase occasionally as needed.  Uses Mucinex occasionally at home.  Also has an air purifier.    3. Routine health maint.  Had mammogram and pap smear with Dr. Pearsno in August.  Second hepatitis A vaccine due in 2 weeks at the pharmacy.    The following portions of the patient's history were reviewed and updated as appropriate: allergies, current medications, past family history, past medical history, past social history, past surgical history and problem list.    Allergies: Patient has no known allergies.    Review of Systems   Constitutional: Negative.    HENT: Negative.    Eyes: Negative.    Respiratory: Negative.    Cardiovascular: Negative.    Gastrointestinal: Negative.    Endocrine: Negative.    Genitourinary: Negative.    Musculoskeletal: Negative.    Skin: Negative.    Allergic/Immunologic: Negative.    Neurological: Negative.    Hematological: Negative.    Psychiatric/Behavioral: Negative.              Wt Readings from Last 3 Encounters:   10/10/18 69.9 kg (154 lb)   04/23/18 76.4 kg (168 lb 6.4 oz)   11/01/17 75.1 kg (165 lb 9.6 oz)     Temp Readings from Last 3 Encounters:   10/10/18 98.1 °F (36.7 °C)   04/23/18 98.3 °F (36.8 °C)   11/01/17 98.6 °F (37 °C)     BP Readings from Last 3 Encounters:   10/10/18 158/96   04/23/18 150/90   11/01/17 140/86     Pulse Readings from Last 3 Encounters:   10/10/18 69   04/23/18 73   11/01/17 71     Body mass index is 24.12 kg/m².  @LASTSAO2(3)@    Physical Exam   Constitutional: She is oriented to person, place, and time. She appears well-developed and well-nourished.    HENT:   Head: Normocephalic and atraumatic.   Eyes: Conjunctivae and EOM are normal.   Neck: Neck supple. No thyromegaly present.   Cardiovascular: Normal rate, regular rhythm and normal heart sounds.    Pulmonary/Chest: Effort normal and breath sounds normal.   Abdominal: Soft. Bowel sounds are normal. There is no hepatosplenomegaly.   Musculoskeletal: Normal range of motion. She exhibits no edema.   Neurological: She is alert and oriented to person, place, and time. She has normal reflexes.   Skin: Skin is warm and dry.   Psychiatric: She has a normal mood and affect. Her behavior is normal.   Nursing note reviewed.      Results for orders placed or performed in visit on 10/04/17   Comprehensive Metabolic Panel   Result Value Ref Range    Glucose 94 65 - 99 mg/dL    BUN 15 6 - 20 mg/dL    Creatinine 0.77 0.57 - 1.00 mg/dL    eGFR Non African Am 78 >60 mL/min/1.73    eGFR African Am 95 >60 mL/min/1.73    BUN/Creatinine Ratio 19.5 7.0 - 25.0    Sodium 141 136 - 145 mmol/L    Potassium 4.0 3.5 - 5.2 mmol/L    Chloride 101 98 - 107 mmol/L    Total CO2 29.1 (H) 22.0 - 29.0 mmol/L    Calcium 9.5 8.6 - 10.5 mg/dL    Total Protein 7.1 6.0 - 8.5 g/dL    Albumin 4.50 3.50 - 5.20 g/dL    Globulin 2.6 gm/dL    A/G Ratio 1.7 g/dL    Total Bilirubin 1.0 0.2 - 1.2 mg/dL    Alkaline Phosphatase 66 40 - 129 U/L    AST (SGOT) 20 5 - 32 U/L    ALT (SGPT) 15 5 - 33 U/L   Hemoglobin A1c   Result Value Ref Range    Hemoglobin A1C 5.30 4.80 - 5.60 %   Lipid Panel With / Chol / HDL Ratio   Result Value Ref Range    Total Cholesterol 183 0 - 200 mg/dL    Triglycerides 61 0 - 150 mg/dL    HDL Cholesterol 76 (H) 40 - 60 mg/dL    VLDL Cholesterol 12.2 7 - 27 mg/dL    LDL Cholesterol  95 0 - 100 mg/dL    Chol/HDL Ratio 2.41    TSH   Result Value Ref Range    TSH 1.240 0.270 - 4.200 mIU/mL   Vitamin B12   Result Value Ref Range    Vitamin B-12 359 211 - 946 pg/mL   Vitamin D 25 Hydroxy   Result Value Ref Range    25 Hydroxy, Vitamin D 32.9  ng/mL   CBC & Differential   Result Value Ref Range    WBC 5.18 4.80 - 10.80 10*3/mm3    RBC 4.62 4.20 - 5.40 10*6/mm3    Hemoglobin 13.7 12.0 - 16.0 g/dL    Hematocrit 39.6 37.0 - 47.0 %    MCV 85.7 81.0 - 99.0 fL    MCH 29.7 27.0 - 31.0 pg    MCHC 34.6 31.0 - 37.0 g/dL    RDW 12.8 11.5 - 14.5 %    Platelets 279 140 - 500 10*3/mm3    Neutrophil Rel % 62.9 45.0 - 70.0 %    Lymphocyte Rel % 23.0 20.0 - 45.0 %    Monocyte Rel % 7.5 3.0 - 8.0 %    Eosinophil Rel % 5.2 (H) 0.0 - 4.0 %    Basophil Rel % 1.2 0.0 - 2.0 %    Neutrophils Absolute 3.26 1.50 - 8.30 10*3/mm3    Lymphocytes Absolute 1.19 0.60 - 4.80 10*3/mm3    Monocytes Absolute 0.39 0.00 - 1.00 10*3/mm3    Eosinophils Absolute 0.27 0.10 - 0.30 10*3/mm3    Basophils Absolute 0.06 0.00 - 0.20 10*3/mm3    Immature Granulocyte Rel % 0.2 0.0 - 0.5 %    Immature Grans Absolute 0.01 0.00 - 0.03 10*3/mm3    nRBC 0.0 0.0 - 0.0 /100 WBC           Vanessa was seen today for hypertension.    Diagnoses and all orders for this visit:    Essential hypertension    Seasonal allergic rhinitis, unspecified trigger    Routine health maintenance      1. HTN.  Somewhat labile.  Continue same.  Monitor at home.  Low sodium diet.  Continue diet and exercise.    2. JOELLE.  Controlled. Continue same.    3. Routine health maint.  Mammogram and pap smear UTD.  Flu vaccine declined.  Tdap and hep A UTD.    Outpatient Medications Prior to Visit   Medication Sig Dispense Refill   • fluticasone (CUTIVATE) 0.05 % cream Apply  topically 2 (Two) Times a Day.     • fluticasone (FLONASE) 50 MCG/ACT nasal spray 2 sprays into each nostril Daily. 1 bottle 2   • losartan (COZAAR) 100 MG tablet TAKE 1 TABLET BY MOUTH EVERY DAY 90 tablet 1   • Metoprolol Succinate 50 MG capsule extended-release 24 hour sprinkle Take 100 mg by mouth Daily. START  50MG DAILY X 1 WEEK, THEN INCREASE TO  100MG DAILY 60 capsule 2     No facility-administered medications prior to visit.      No orders of the defined types were  placed in this encounter.    [unfilled]  Medications Discontinued During This Encounter   Medication Reason   • Metoprolol Succinate 50 MG capsule extended-release 24 hour sprinkle *Therapy completed         Return in about 6 months (around 4/10/2019).

## 2019-03-08 RX ORDER — LOSARTAN POTASSIUM 100 MG/1
100 TABLET ORAL DAILY
Qty: 90 TABLET | Refills: 1 | Status: SHIPPED | OUTPATIENT
Start: 2019-03-08

## 2019-03-19 ENCOUNTER — TELEPHONE (OUTPATIENT)
Dept: INTERNAL MEDICINE | Facility: CLINIC | Age: 54
End: 2019-03-19

## 2019-03-19 NOTE — TELEPHONE ENCOUNTER
Called patient back.  She states that she actually has an appointment with her cardiologist tomorrow.  She will see what they have to say    ----- Message from Josh Cohen MD sent at 3/18/2019  4:59 PM EDT -----  Regarding: RE: BP MED SIDE EFFECTS  Contact: [phone]  Please have her get some labs done.  It has been well over a year and we need to make sure electrolytes and kidneys, blood counts, etc are okay as this could cause cramping and headaches.  I think she has an appt with me next month but she could move it up sooner if needed.  ----- Message -----  From: Dee Aiken MA  Sent: 3/15/2019   2:50 PM  To: Josh Cohen MD  Subject: FW: BP MED SIDE EFFECTS                          Patient states that she is having sinus issues, nausea, headaches, and right leg pain.  She states she has been traveling.  I voiced concern over the right leg pain since she had been traveling; she assured me that the leg is not swollen or discolored. She states it comes and goes and feels more like cramping.  She is not open to the possibility that these symptoms may be allergies or brought on by the travel.  She wants another BP med.  I also reiterated that if the leg starts to hurt continually/gets red/swollen to be evaluated in ER.    ----- Message -----  From: Dee Aiken MA  Sent: 3/15/2019   9:57 AM  To: Dee Aiken MA  Subject: FW: BP MED SIDE EFFECTS                              ----- Message -----  From: Tommy Recio  Sent: 3/14/2019   4:14 PM  To: [facility]  Subject: BP MED SIDE EFFECTS                              KEENA PT    Patient calling to ask dr cohen's nurse to call her back regarding the side effects of her bp meds.    Thanks!  tommy

## 2019-04-23 ENCOUNTER — RESULTS ENCOUNTER (OUTPATIENT)
Dept: INTERNAL MEDICINE | Facility: CLINIC | Age: 54
End: 2019-04-23

## 2019-04-23 DIAGNOSIS — Z00.00 ANNUAL PHYSICAL EXAM: ICD-10-CM

## 2019-08-27 ENCOUNTER — APPOINTMENT (OUTPATIENT)
Dept: WOMENS IMAGING | Facility: HOSPITAL | Age: 54
End: 2019-08-27

## 2019-08-27 PROCEDURE — 77067 SCR MAMMO BI INCL CAD: CPT | Performed by: RADIOLOGY

## 2021-06-25 ENCOUNTER — TELEPHONE (OUTPATIENT)
Dept: PHYSICAL THERAPY | Facility: CLINIC | Age: 56
End: 2021-06-25

## 2021-06-25 NOTE — TELEPHONE ENCOUNTER
PATIENT WOULD LIKE A CALL ABOUT SOFT TISSUE THERAPY - SHE WANTS TO KNOW ALL IT ENTAILS BEFORE SCHEDULING

## 2022-11-01 ENCOUNTER — APPOINTMENT (OUTPATIENT)
Dept: WOMENS IMAGING | Facility: HOSPITAL | Age: 57
End: 2022-11-01

## 2022-11-01 PROCEDURE — 77063 BREAST TOMOSYNTHESIS BI: CPT | Performed by: RADIOLOGY

## 2022-11-01 PROCEDURE — 77067 SCR MAMMO BI INCL CAD: CPT | Performed by: RADIOLOGY
